# Patient Record
Sex: MALE | Race: ASIAN | HISPANIC OR LATINO | Employment: FULL TIME | URBAN - METROPOLITAN AREA
[De-identification: names, ages, dates, MRNs, and addresses within clinical notes are randomized per-mention and may not be internally consistent; named-entity substitution may affect disease eponyms.]

---

## 2017-01-18 ENCOUNTER — TRANSCRIBE ORDERS (OUTPATIENT)
Dept: ADMINISTRATIVE | Facility: HOSPITAL | Age: 57
End: 2017-01-18

## 2017-01-18 ENCOUNTER — HOSPITAL ENCOUNTER (OUTPATIENT)
Dept: RADIOLOGY | Facility: HOSPITAL | Age: 57
Discharge: HOME | End: 2017-01-18
Payer: COMMERCIAL

## 2017-01-18 ENCOUNTER — ALLSCRIPTS OFFICE VISIT (OUTPATIENT)
Dept: OTHER | Facility: OTHER | Age: 57
End: 2017-01-18

## 2017-01-18 DIAGNOSIS — R93.89 ABNORMAL FINDINGS ON DIAGNOSTIC IMAGING OF OTHER SPECIFIED BODY STRUCTURES: ICD-10-CM

## 2017-01-18 DIAGNOSIS — J18.9 PNEUMONIA: ICD-10-CM

## 2017-01-18 PROCEDURE — 71020 HB CHEST X-RAY 2VW FRONTAL&LATL: CPT

## 2017-01-20 ENCOUNTER — GENERIC CONVERSION - ENCOUNTER (OUTPATIENT)
Dept: OTHER | Facility: OTHER | Age: 57
End: 2017-01-20

## 2017-01-21 ENCOUNTER — TRANSCRIBE ORDERS (OUTPATIENT)
Dept: ADMINISTRATIVE | Facility: HOSPITAL | Age: 57
End: 2017-01-21

## 2017-01-21 DIAGNOSIS — R93.89 ABNORMAL CXR (CHEST X-RAY): Primary | ICD-10-CM

## 2017-01-22 LAB
A/G RATIO (HISTORICAL): 1.6 (ref 1.1–2.5)
ALBUMIN SERPL BCP-MCNC: 4.3 G/DL (ref 3.5–5.5)
ALP SERPL-CCNC: 65 IU/L (ref 39–117)
ALT SERPL W P-5'-P-CCNC: 48 IU/L (ref 0–44)
AST SERPL W P-5'-P-CCNC: 26 IU/L (ref 0–40)
BASOPHILS # BLD AUTO: 0 %
BASOPHILS # BLD AUTO: 0 X10E3/UL (ref 0–0.2)
BILIRUB SERPL-MCNC: 0.8 MG/DL (ref 0–1.2)
BUN SERPL-MCNC: 10 MG/DL (ref 6–24)
BUN/CREA RATIO (HISTORICAL): 11 (ref 9–20)
CALCIUM SERPL-MCNC: 8.9 MG/DL (ref 8.7–10.2)
CHLORIDE SERPL-SCNC: 100 MMOL/L (ref 96–106)
CHOLEST SERPL-MCNC: 178 MG/DL (ref 100–199)
CHOLEST/HDLC SERPL: 4.6 RATIO UNITS (ref 0–5)
CO2 SERPL-SCNC: 25 MMOL/L (ref 18–29)
CREAT SERPL-MCNC: 0.93 MG/DL (ref 0.76–1.27)
DEPRECATED RDW RBC AUTO: 13.4 % (ref 12.3–15.4)
EGFR AFRICAN AMERICAN (HISTORICAL): 104 ML/MIN/1.73
EGFR-AMERICAN CALC (HISTORICAL): 90 ML/MIN/1.73
EOSINOPHIL # BLD AUTO: 0.2 X10E3/UL (ref 0–0.4)
EOSINOPHIL # BLD AUTO: 3 %
GLUCOSE SERPL-MCNC: 110 MG/DL (ref 65–99)
HBA1C MFR BLD HPLC: 6.6 % (ref 4.8–5.6)
HCT VFR BLD AUTO: 43.9 % (ref 37.5–51)
HDLC SERPL-MCNC: 39 MG/DL
HGB BLD-MCNC: 15.1 G/DL (ref 12.6–17.7)
IMM.GRANULOCYTES (CD4/8) (HISTORICAL): 0 %
IMM.GRANULOCYTES (CD4/8) (HISTORICAL): 0 X10E3/UL (ref 0–0.1)
LDLC SERPL CALC-MCNC: 91 MG/DL (ref 0–99)
LYMPHOCYTES # BLD AUTO: 1.6 X10E3/UL (ref 0.7–3.1)
LYMPHOCYTES # BLD AUTO: 31 %
MCH RBC QN AUTO: 33.1 PG (ref 26.6–33)
MCHC RBC AUTO-ENTMCNC: 34.4 G/DL (ref 31.5–35.7)
MCV RBC AUTO: 96 FL (ref 79–97)
MONOCYTES # BLD AUTO: 0.3 X10E3/UL (ref 0.1–0.9)
MONOCYTES (HISTORICAL): 7 %
NEUTROPHILS # BLD AUTO: 2.9 X10E3/UL (ref 1.4–7)
NEUTROPHILS # BLD AUTO: 59 %
PLATELET # BLD AUTO: 207 X10E3/UL (ref 150–379)
POTASSIUM SERPL-SCNC: 4 MMOL/L (ref 3.5–5.2)
PROSTATE SPECIFIC ANTIGEN (HISTORICAL): 1.6 NG/ML (ref 0–4)
RBC (HISTORICAL): 4.56 X10E6/UL (ref 4.14–5.8)
SODIUM SERPL-SCNC: 141 MMOL/L (ref 134–144)
TOT. GLOBULIN, SERUM (HISTORICAL): 2.7 G/DL (ref 1.5–4.5)
TOTAL PROTEIN (HISTORICAL): 7 G/DL (ref 6–8.5)
TRIGL SERPL-MCNC: 242 MG/DL (ref 0–149)
TSH SERPL DL<=0.05 MIU/L-ACNC: 1.49 UIU/ML (ref 0.45–4.5)
VLDLC SERPL CALC-MCNC: 48 MG/DL (ref 5–40)
WBC # BLD AUTO: 5 X10E3/UL (ref 3.4–10.8)

## 2017-01-27 ENCOUNTER — HOSPITAL ENCOUNTER (OUTPATIENT)
Dept: CT IMAGING | Facility: HOSPITAL | Age: 57
Discharge: HOME | End: 2017-01-27
Payer: COMMERCIAL

## 2017-01-27 DIAGNOSIS — R93.89 ABNORMAL CXR (CHEST X-RAY): ICD-10-CM

## 2017-01-27 PROCEDURE — 71260 CT THORAX DX C+: CPT

## 2017-01-27 RX ADMIN — IOHEXOL 75 ML: 350 INJECTION, SOLUTION INTRAVENOUS at 18:13

## 2017-04-05 ENCOUNTER — ALLSCRIPTS OFFICE VISIT (OUTPATIENT)
Dept: OTHER | Facility: OTHER | Age: 57
End: 2017-04-05

## 2017-04-28 DIAGNOSIS — R91.8 OTHER NONSPECIFIC ABNORMAL FINDING OF LUNG FIELD: ICD-10-CM

## 2017-04-29 ENCOUNTER — HOSPITAL ENCOUNTER (OUTPATIENT)
Dept: CT IMAGING | Facility: HOSPITAL | Age: 57
Discharge: HOME | End: 2017-04-29
Payer: COMMERCIAL

## 2017-04-29 DIAGNOSIS — R91.8 OTHER NONSPECIFIC ABNORMAL FINDING OF LUNG FIELD: ICD-10-CM

## 2017-04-29 PROCEDURE — 71250 CT THORAX DX C-: CPT

## 2017-05-03 ENCOUNTER — GENERIC CONVERSION - ENCOUNTER (OUTPATIENT)
Dept: OTHER | Facility: OTHER | Age: 57
End: 2017-05-03

## 2018-01-14 VITALS
WEIGHT: 136.8 LBS | SYSTOLIC BLOOD PRESSURE: 110 MMHG | DIASTOLIC BLOOD PRESSURE: 72 MMHG | BODY MASS INDEX: 25.83 KG/M2 | HEART RATE: 80 BPM | TEMPERATURE: 97 F | HEIGHT: 61 IN

## 2018-01-14 NOTE — RESULT NOTES
Discussion/Summary   CT scan is stable from last one  No new nodules, no tumors or pneumonia  No further testing needed at this time      Verified Results  * CT CHEST [address] [id number] 02:49PM Russ Nelson Order Number: [id number]    - Patient Instructions: To schedule this appointment, please contact Central Scheduling at [phone]  Test Name Result Flag Reference   CT CHEST WO CONTRAST (Report)     CT CHEST WITHOUT IV CONTRAST     INDICATION: Follow-up pulmonary nodules  COMPARISON: 1/27/2017     TECHNIQUE: CT examination of the chest was performed without intravenous contrast  Reformatted images were created in axial, sagittal, and coronal planes  Radiation dose length product (DLP) for this visit: 172 7 mGy-cm   This examination, like all CT scans performed in the [facility], was performed utilizing techniques to minimize radiation dose exposure, including the use of iterative    reconstruction and automated exposure control  FINDINGS:     LUNGS: 6 mm nodular opacity within the lingula is stable  6 mm nodular opacity within the right lower lobe is also stable (series 601, image 106)  Previously described 2 mm left apical nodules not well seen on today's study  No focal consolidations  No new pulmonary nodules  PLEURA: Unremarkable  HEART/GREAT VESSELS: Unremarkable for patient's age  MEDIASTINUM AND KARTHIK: Mildly prominent right hilar lymph node is stable, measuring 11 mm in short axis  CHEST WALL AND LOWER NECK: Unremarkable  VISUALIZED STRUCTURES IN THE UPPER ABDOMEN: Unremarkable  OSSEOUS STRUCTURES: No acute fracture  No destructive osseous lesion  IMPRESSION:       1  Stable 6 mm nodules within the lingula and right lower lobe  Previously described 2 mm left apical nodule is not well seen on today's study  No new nodules identified         Based on current Fleischner Society 2017 Guidelines, recommend:    Considering follow-up CT at 18-24 months since the initial CT of 1/27/2017 if patient is low risk  Definitive follow-up at this interval is recommended if the patient is high risk       (follow-up in July 2018 - January 2019)  2  Stable mildly prominent right hilar lymph node           ##sigslh##sigslh            Workstation performed: [id number]     Signed by:   Jenny Navarro MD   5/1/17

## 2018-01-15 NOTE — PROGRESS NOTES
Assessment    1  Encounter for preventive health examination (V70 0) (Z00 00)   2  Allergic rhinitis (477 9) (J30 9)   3  Pulmonary nodules (793 19) (R91 8)    Plan  Allergic rhinitis    · MethylPREDNISolone Acetate 80 MG/ML Injection Suspension  (DEPO-Medrol)  PMH: Encounter for screening colonoscopy    · 2 - Kera Garduno MD, Jake Gilliam  (Gastroenterology) Physician Referral  Consult Only: the  expectation is that the referring provider will communicate back to the patient on  treatment options  Evaluation and Treatment: the expectation is that the referred to  provider will communicate back to the patient on treatment options  Status: Active   Requested for: 03Apr2017  Care Summary provided  : Yes  Pulmonary nodules    · * CT CHEST WO CONTRAST; Status:Hold For - Exact Date; Requested for:Approx  [id number]; History of Present Illness  HM, Adult Male: The patient is being seen for a health maintenance evaluation  General Health:   Screening:   HPI: Pain in chest like sticking feeling  Cough resolved from last visit  Review of Systems    Constitutional: not feeling poorly and not feeling tired  Cardiovascular: chest pain, but no intermittent leg claudication, no palpitations and no extremity edema  Respiratory: no cough and no shortness of breath during exertion  Gastrointestinal: no nausea  Genitourinary: no dysuria  Musculoskeletal: no arthralgias  Psychiatric: no anxiety and no depression  Endocrine: no muscle weakness  Active Problems    1  Abnormal chest xray (793 2) (R93 8)   2  Allergic rhinitis (477 9) (J30 9)   3  Benign prostatic hypertrophy (600 00) (N40 0)   4  Community acquired pneumonia (5) (J18 9)   5  Hyperglycemia (790 29) (R73 9)   6  Hyperlipidemia (272 4) (E78 5)   7  Irritable bowel syndrome (564 1) (K58 9)   8  Orthostatic hypotension (458 0) (I95 1)   9  Plantar fasciitis (728 71) (M72 2)   10   Pulmonary nodules (793 19) (R91 8)    Past Medical History    · History of Shrapnel Wound Caused By Secondary Fragments (E923 8)    Social History    · Denied: History of Alcohol Use (History)   · Denied: History of Drug Use   · Former smoker (W28 14) (M60 380)   ·    · Non-smoker (V49 89) (Z78 9)    Current Meds   1  Cefuroxime Axetil 500 MG Oral Tablet; TAKE 1 TABLET EVERY 12 HOURS DAILY; Therapy: [id number] to (Evaluate:54Jlk9617)  Requested for: 93TXS3967; Last   Rx:18Jan2017 Ordered    Allergies    1  No Known Drug Allergies    Vitals   Recorded: [id number] 06:55PM Recorded: 75XBF0326 06:50PM   Temperature 97 6 F, Tympanic    Heart Rate 88    Systolic 94    Diastolic 62    Height  5 ft 1 in   Weight  139 lb 3 2 oz   BMI Calculated  26 3   BSA Calculated  1 61     Physical Exam    Constitutional   General appearance: No acute distress, well appearing and well nourished  Ears, Nose, Mouth, and Throat   External inspection of ears and nose: Normal     Otoscopic examination: Tympanic membranes translucent with normal light reflex  Canals patent without erythema  Hearing: Normal     Nasal mucosa, septum, and turbinates: Normal without edema or erythema  Lips, teeth, and gums: Normal, good dentition  Oropharynx: Normal with no erythema, edema, exudate or lesions  Neck   Neck: Supple, symmetric, trachea midline, no masses  Thyroid: Normal, no thyromegaly  Pulmonary   Respiratory effort: No increased work of breathing or signs of respiratory distress  Auscultation of lungs: Clear to auscultation  Cardiovascular   Auscultation of heart: Normal rate and rhythm, normal S1 and S2, no murmurs  Carotid pulses: 2+ bilaterally  Peripheral vascular exam: Normal     Examination of extremities for edema and/or varicosities: Normal     Abdomen   Abdomen: Non-tender, no masses  Liver and spleen: No hepatomegaly or splenomegaly      Musculoskeletal   Gait and station: Normal        Future Appointments    Date/Time Provider Specialty Site   07/05/2017 06:40 PM Allyson Choi MD     Signatures   Electronically signed by : Laura Sanders MD; Apr 28 2017  5:17AM EST                       (Author)

## 2018-01-17 NOTE — RESULT NOTES
Verified Results  * XR CHEST PA & LATERAL [id number] 07:02PM Luciano Moffett Order Number: [id number]     Test Name Result Flag Reference   XR CHEST PA & LATERAL (Report)     CHEST 2 View     INDICATION: Cough  Possible pneumonia  COMPARISON: None     VIEWS: PA and lateral projections; 2 images     FINDINGS:        Heart is normal size  Prominent sammi particularly on the lateral view suggesting adenopathy  The lungs are clear  No pneumothorax or pleural effusion  Surgical clip in the posterior left hemithorax  Visualized osseous structures appear within normal limits for the patient's age  Shrapnel noted in the right lung base anteriorly  IMPRESSION:     Prominent sammi particularly on the lateral view suggesting adenopathy  Recommend chest CT scan  ##sigslh##sigslh            Workstation performed: [id number]     Signed by: Bobby Allison MD   1/19/17       Plan  Abnormal chest xray    · CT CHEST W WO CONTRAST; Status:Need Information - Financial Authorization; Requested [id number];     Discussion/Summary    CXR shows enlarged lymph nodes that could be caused by infection or by lymphoma  He needs to have a CT of chest with contrast tos get a better view  Order is on printer but may need prior auth  He needs to get the blood test done from our visit  patient aware

## 2018-01-22 VITALS
TEMPERATURE: 97.6 F | WEIGHT: 139.2 LBS | DIASTOLIC BLOOD PRESSURE: 62 MMHG | HEART RATE: 88 BPM | BODY MASS INDEX: 26.28 KG/M2 | SYSTOLIC BLOOD PRESSURE: 94 MMHG | HEIGHT: 61 IN

## 2018-03-16 ENCOUNTER — TELEPHONE (OUTPATIENT)
Dept: FAMILY MEDICINE CLINIC | Facility: HOSPITAL | Age: 58
End: 2018-03-16